# Patient Record
Sex: MALE | Race: WHITE
[De-identification: names, ages, dates, MRNs, and addresses within clinical notes are randomized per-mention and may not be internally consistent; named-entity substitution may affect disease eponyms.]

---

## 2019-02-12 NOTE — ED
Abdominal Pain HPI





<Glen Avila - Last Filed: 02/12/19 17:59>





- General


Source: patient


Mode of arrival: ambulatory


Limitations: no limitations





<Yvrose Mortensen - Last Filed: 02/12/19 20:43>





- General


Chief Complaint: Abdominal Pain


Stated Complaint: Lower abd pain-Med Ex sent


Time Seen by Provider: 02/12/19 13:43





- History of Present Illness


Initial Comments: 


This is a 28-year-old male presenting today for chief complaint of right-sided 

abdominal pain, chills and night sweats.  Patient states that he has had fever, 

chills night sweats for the past 1-2 days, patient did not record a temperature

, he states he's also been experiencing right-sided abdominal pain.  He states 

he presented for evaluation at an urgent care where he was sent to the 

emergency department to rule out appendicitis.  Patient denies any nausea 

vomiting diarrhea.  He denies any melena or hematochezia.  Patient denies any 

testicular swelling or severe testicular pain.  Patient denies any chest pain, 

dyspnea, dyspnea on exertion.  Patient denies any inability to tolerate by 

mouth intake.  Patient denies any upper abdominal pain, patient denies noticing 

a pattern with the pain.  He states it does seem to increase with ambulation.  

Remaining review of systems negative, patient denies any recent back pain, 

abdominal pain, numbness or tingling, dysuria or hematuria, headaches or visual 

changes, or any other complaints.


 (Yvrose Mortensen)





- Related Data


 Home Medications











 Medication  Instructions  Recorded  Confirmed


 


No Known Home Medications  02/12/19 02/12/19











 Allergies











Allergy/AdvReac Type Severity Reaction Status Date / Time


 


No Known Allergies Allergy   Verified 02/12/19 15:35














Review of Systems


ROS Other: All systems not noted in ROS Statement are negative.





<Glen Avila - Last Filed: 02/12/19 17:59>


ROS Other: All systems not noted in ROS Statement are negative.





<Yvrose Mortensen - Last Filed: 02/12/19 20:43>


ROS Statement: 


Those systems with pertinent positive or pertinent negative responses have been 

documented in the HPI.








Past Medical History


Past Medical History: No Reported History


History of Any Multi-Drug Resistant Organisms: None Reported


Past Surgical History: No Surgical Hx Reported


Past Psychological History: No Psychological Hx Reported


Smoking Status: Never smoker


Past Alcohol Use History: Occasional


Past Drug Use History: None Reported





<Yvrose Mortensen - Last Filed: 02/12/19 20:43>





General Exam





<Glen Avila - Last Filed: 02/12/19 17:59>


Limitations: no limitations





<Yvrose Mortensen - Last Filed: 02/12/19 20:43>





- General Exam Comments


Initial Comments: 


General:  The patient is awake and alert, in no distress, and does not appear 

acutely ill. 


Eye:  Pupils are equal, round and reactive to light, extra-ocular movements are 

intact.  No nystagmus.  There is normal conjunctiva bilaterally.  No signs of 

icterus.  


Ears, nose, mouth and throat:  There are moist mucous membranes and no oral 

lesions. 


Neck:  The neck is supple, there is no tenderness or JVD.  


Cardiovascular:  There is a regular rate and rhythm. No murmur, rub or gallop 

is appreciated.


Respiratory:  Lungs are clear to auscultation, respirations are non-labored, 

breath sounds are equal.  No wheezes, stridor, rales, or rhonchi.


Gastrointestinal: No noted diaphoresis, jaundice, pallor, protecting postures 

or squirming.


Symmetrical pigmentation of abdomen without signs of inflammation, scars, or 

striae. Umbilicus mildline, inverted without swelling. No dilated veins. 

Abdomen contour schaphiod, no noted abdominal distention. No visible masses. No 

peristalsis, aortic pulsations, or ventral hernia. Bowel sounds audible in all 

4 quadrants, unremarkable.  No friction rubs or venous hums. No epigastic, 

hepatic or abdominal bruits.  Patient is tender diffusely over the right side 

the abdomen mid to lower to deep and light palpation.. Liver edge, not 

palpable. Spleen edge, right and left kidney not palpable. Superior bladder 

margin non-tender. 


Special Testing:


Negative [place], [place], McBurney, [place], cutaneous hyperesthesia. 

Iliopsoas and obturator tests negative bilaterally. Negative Heel Jar test. No 

CVA tenderness. Digital rectal exam deferred. Negative grey turners or cullens 

sign


Musculoskeletal:  Normal ROM, no tenderness.  Strength 5/5. Sensation intact. 

Pulses equal bilaterally 2+.  


Neurological:  A&O x 3. CN II-XII intact, There are no obvious motor or sensory 

deficits. Coordination appears grossly intact. Speech is normal.


Skin:  Skin is warm and dry and no rashes or lesions are noted. 


Psychiatric:  Cooperative, appropriate mood & affect, normal judgment.  


 (Yvrose Mortensen)





Course





<Glen Avila - Last Filed: 02/12/19 17:59>





<Yvrose Mortensen - Last Filed: 02/12/19 20:43>


 Vital Signs











  02/12/19 02/12/19





  13:30 18:22


 


Temperature 98.5 F 


 


Pulse Rate 66 63


 


Respiratory 18 18





Rate  


 


Blood Pressure 129/83 138/87


 


O2 Sat by Pulse 100 100





Oximetry  














- Reevaluation(s)


Reevaluation #1: 





02/12/19 18:00


PA supervision: I proceeded face-to-face evaluation the patient is been having 

abdominal pain especially right lower quadrant type pain with some night sweats 

and generally not feeling well.  He was seen at outpatient clinic and sent here 

for evaluation for possible appendicitis.  He does demonstrate tenderness 

palpation of the mid abdomen.  CAT scan shows evidence of a transverse colonic 

diverticulitis with circumferential inflammation.  Due to the symptoms patient 

will require IV antibiotics and admission I did discuss case with Dr. Hollins.  

Dr. Carballo will be consulted.  I do agree with the assessment and plan (Glen Avila)





Medical Decision Making





- Lab Data


Result diagrams: 


 02/12/19 16:00





 02/12/19 16:00





<Glen Avila - Last Filed: 02/12/19 17:59>





- Lab Data


Result diagrams: 


 02/12/19 16:00





 02/12/19 16:00





<Yvrose Mortensen - Last Filed: 02/12/19 20:43>





- Medical Decision Making


28-year-old male presenting today for chief complaint of right-sided abdominal 

pain sent for rule out appendicits.  Patient is no leukocytosis, afebrile upon 

arrival.  Patient does have abdominal pain on examination concerning for 

possible acute abdomen.  Patient did not show any recurrent rigidity or 

guarding or signs for peritoneal irritation on examination.  Patient does 

however admit to pain with ambulation at times.  CT obtained revealed a section 

of the transverse colon concerning for diverticulitis with moderate infection, 

remaining CT unremarkable, no evidence of acute appendicits.  Given the extent 

of inflammation and inflammatory changes I feel patient would benefit from IV 

antibiotics and admission.  CT was reviewed by myself as well as admitting 

provider Dr. Avila, he did evaluate the patient face-to-face recommending 

inpatient treatment.  Patient is agreeable to admission denies questions at 

this time.  Patient will have IV fluids running, ordered liquid diet and have 

gastroenterology on consult.  Patient started on Zosyn, there is no further 

orders given by admitting provider who spoke case in detail with attending 

provider Dr. Avila.


 (BalbinaYvrose L)





- Lab Data


 Lab Results











  02/12/19 02/12/19 02/12/19 Range/Units





  16:00 16:00 16:00 


 


WBC   10.3   (3.8-10.6)  k/uL


 


RBC   5.50   (4.30-5.90)  m/uL


 


Hgb   16.5   (13.0-17.5)  gm/dL


 


Hct   47.6   (39.0-53.0)  %


 


MCV   86.6   (80.0-100.0)  fL


 


MCH   30.0   (25.0-35.0)  pg


 


MCHC   34.6   (31.0-37.0)  g/dL


 


RDW   12.7   (11.5-15.5)  %


 


Plt Count   222   (150-450)  k/uL


 


Neutrophils %   66   %


 


Lymphocytes %   26   %


 


Monocytes %   6   %


 


Eosinophils %   2   %


 


Basophils %   0   %


 


Neutrophils #   6.8   (1.3-7.7)  k/uL


 


Lymphocytes #   2.7   (1.0-4.8)  k/uL


 


Monocytes #   0.6   (0-1.0)  k/uL


 


Eosinophils #   0.2   (0-0.7)  k/uL


 


Basophils #   0.0   (0-0.2)  k/uL


 


Sodium  143    (137-145)  mmol/L


 


Potassium  4.5    (3.5-5.1)  mmol/L


 


Chloride  104    ()  mmol/L


 


Carbon Dioxide  27    (22-30)  mmol/L


 


Anion Gap  12    mmol/L


 


BUN  13    (9-20)  mg/dL


 


Creatinine  0.89    (0.66-1.25)  mg/dL


 


Est GFR (CKD-EPI)AfAm  >90    (>60 ml/min/1.73 sqM)  


 


Est GFR (CKD-EPI)NonAf  >90    (>60 ml/min/1.73 sqM)  


 


Glucose  98    (74-99)  mg/dL


 


Calcium  9.8    (8.4-10.2)  mg/dL


 


Total Bilirubin  0.9    (0.2-1.3)  mg/dL


 


AST  20    (17-59)  U/L


 


ALT  30    (21-72)  U/L


 


Alkaline Phosphatase  42    ()  U/L


 


Total Protein  7.9    (6.3-8.2)  g/dL


 


Albumin  4.8    (3.5-5.0)  g/dL


 


Amylase  60    ()  U/L


 


Lipase  32    ()  U/L


 


Urine Color    Yellow  


 


Urine Appearance    Clear  (Clear)  


 


Urine pH    5.5  (5.0-8.0)  


 


Ur Specific Gravity    1.028  (1.001-1.035)  


 


Urine Protein    Trace H  (Negative)  


 


Urine Glucose (UA)    Negative  (Negative)  


 


Urine Ketones    Negative  (Negative)  


 


Urine Blood    Negative  (Negative)  


 


Urine Nitrite    Negative  (Negative)  


 


Urine Bilirubin    Negative  (Negative)  


 


Urine Urobilinogen    <2.0  (<2.0)  mg/dL


 


Ur Leukocyte Esterase    Negative  (Negative)  














Disposition





<Glen Avila - Last Filed: 02/12/19 17:59>


Is patient prescribed a controlled substance at d/c from ED?: No


Time of Disposition: 17:58


Decision to Admit Reason: Admit from EC


Decision Date: 02/12/19


Decision Time: 17:58





<Yvrose Mortensen - Last Filed: 02/12/19 20:43>


Clinical Impression: 


 Diverticulitis





Disposition: ADMITTED AS IP TO THIS HOSP


Condition: Stable

## 2019-02-12 NOTE — CT
EXAMINATION TYPE: CT abdomen pelvis w con

 

DATE OF EXAM: 2/12/2019

 

COMPARISON: None

 

HISTORY: Generalized abdominal pain with fever and chills.

 

CT DLP: 650.8 mGycm

Automated exposure control for dose reduction was used.

 

TECHNIQUE:  Helical acquisition of images was performed from the lung bases through the pelvis.

 

CONTRAST: Performed without Oral Contrast and with IV Contrast, patient injected with 100 mL of Isovu
e 300.

 

FINDINGS: 

 

LUNG BASES: No significant abnormality is appreciated.

 

LIVER/GB: No significant abnormality is appreciated.

 

PANCREAS: No significant abnormality is seen.

 

SPLEEN: No significant abnormality is seen.

 

ADRENALS: No significant abnormality is seen.

 

KIDNEYS: No significant abnormality is seen.

 

PERITONEAL CAVITY:  No pneumoperitoneum. No fluid.

 

RETROPERITONEAL ADENOPATHY:  None visualized

 

REPRODUCTIVE ORGANS: No significant abnormality is seen

 

URINARY BLADDER:  No significant abnormality is seen.

 

PELVIC ADENOPATHY:  None visualized.

 

OSSEOUS STRUCTURES:  No significant abnormality is seen.

 

BOWEL:  There is a 5 cm segment of distal transverse colon demonstrating marked pericolonic inflammat
ion and moderate-marked circumferential wall thickening. It is difficult to visualize the margins of 
the colon, but there appears to be a 1 cm diverticulum at the center of this inflammatory process ema
nating anteriorly from the transverse colon (axial image 34 of 96). 

 

There are a few scattered descending and sigmoid colonic diverticula are noted.

 

OTHER: No acute vascular findings.

 

IMPRESSION: 

MODERATE-MARKED DIVERTICULITIS OF THE DISTAL TRANSVERSE COLON. 

 

EVENTUAL FOLLOW-UP DIRECT VISUALIZATION CAN EXCLUDE UNDERLYING PROCESS.

## 2019-02-13 NOTE — P.GSCN
History of Present Illness


Consult date: 02/13/19


Reason for Consult: 





abdominal pain








Requesting physician: Yvrose Mortensen


History of present illness: 





CHIEF COMPLAINT: Abdominal pain





HISTORY OF PRESENT ILLNESS: 28-year-old  male who presents to 

emergency room with a chief complaint of abdominal pain.  The patient reports 

the pain has been present for approximately 2 days.  He denies nausea or 

vomiting.  Denies diarrhea or constipation.  Denies melena, hematemesis, or 

hematochezia. Denies history of similar symptoms.





PAST MEDICAL HISTORY: 


See list.





PAST SURGICAL HISTORY: 


See list.





MEDICATIONS: 


See list.





ALLERGIES: 


See list.





SOCIAL HISTORY: No illicit drug use.  





REVIEW OF SYSTEMS: 


CONSTITUTIONAL: Denies fever or chills.


HEENT: Denies blurred vision, vision changes, or eye pain. Denies hemoptysis 


ENDOCRINE: Denies heat or cold intolerance.


CARDIOVASCULAR: Denies chest pain or pressure.


RESPIRATORY: No shortness of breath. 


GASTROINTESTINAL: Reports abdominal pain.  Denies nausea or vomiting.


NEURO: Denies history of seizures.


PSYCH: No depression or suicidal ideation


HEMATOLOGIC: Denies bleeding disorders.


LYMPHATIC:  The patient denies any lumps and bumps around the neck. 


GENITOURINARY:  Denies any blood in urine or increased urinary frequency.  


MUSCULOSKELETAL:  Denies myalgias. Denies joint swelling. Denies decreased 

range of motion beyond patients baseline.


SKIN: Denies pruitis. Denies rash.





PHYSICAL EXAM: 


VITAL SIGNS: Currently stable.


GENERAL: Well-developed in no acute distress. 


HEENT:  No sclera icterus. Extraocular movements grossly intact.  Moist buccal 

mucosa. 


Head is atraumatic, normocephalic. Hears conversational speech. No nasal 

drainage.


NECK:  Supple without lymphadenopathy.


CHEST:  Non-labored respirations and equal bilateral excursions. 


CARDIOVASCULAR:  Regular rate with regular rhythm.  Palpable 2+ radial pulses.


ABDOMEN:  Soft.  Nondistended.  Nontender.


MUSCULOSKELETAL:  No clubbing, cyanosis or edema.


NEUROLOGIC:  No focal or lateralizing signs.  Cranial nerves II through XII 

grossly intact.


PSYCH:  Appropriate affect.  Alert and oriented to person, place and time.


SKIN: Well perfused.  Good skin turgor. 








ASSESSMENT: 


1.  Abdominal pain


2.  Diverticulitis of distal transverse colon








PLAN: 


1. Clear liquid diet


2. Levaquin and Flagyl


3. Patient may follow up with Dr. Carballo in 1 week


4. He will require outpatient colonoscopy in 4 weeks





Nurse practitioner note has been reviewed by physician. Signing provider agrees 

with the documented findings, assessment, and plan of care. 








Past Medical History


Past Medical History: No Reported History


History of Any Multi-Drug Resistant Organisms: None Reported


Past Surgical History: No Surgical Hx Reported


Past Psychological History: No Psychological Hx Reported


Smoking Status: Never smoker


Past Alcohol Use History: Occasional


Past Drug Use History: None Reported





Medications and Allergies


 Home Medications











 Medication  Instructions  Recorded  Confirmed  Type


 


No Known Home Medications  02/12/19 02/12/19 History











 Allergies











Allergy/AdvReac Type Severity Reaction Status Date / Time


 


No Known Allergies Allergy   Verified 02/12/19 15:35














Surgical - Exam


 Vital Signs











Temp Pulse Resp BP Pulse Ox


 


 98.5 F   66   18   129/83   100 


 


 02/12/19 13:30  02/12/19 13:30  02/12/19 13:30  02/12/19 13:30  02/12/19 13:30














Results





- Labs





 02/12/19 16:00





 02/12/19 16:00


 Abnormal Lab Results - Last 24 Hours (Table)











  02/12/19 Range/Units





  16:00 


 


Urine Protein  Trace H  (Negative)  








 Diabetes panel











  02/12/19 Range/Units





  16:00 


 


Sodium  143  (137-145)  mmol/L


 


Potassium  4.5  (3.5-5.1)  mmol/L


 


Chloride  104  ()  mmol/L


 


Carbon Dioxide  27  (22-30)  mmol/L


 


BUN  13  (9-20)  mg/dL


 


Creatinine  0.89  (0.66-1.25)  mg/dL


 


Glucose  98  (74-99)  mg/dL


 


Calcium  9.8  (8.4-10.2)  mg/dL


 


AST  20  (17-59)  U/L


 


ALT  30  (21-72)  U/L


 


Alkaline Phosphatase  42  ()  U/L


 


Total Protein  7.9  (6.3-8.2)  g/dL


 


Albumin  4.8  (3.5-5.0)  g/dL








 Calcium panel











  02/12/19 Range/Units





  16:00 


 


Calcium  9.8  (8.4-10.2)  mg/dL


 


Albumin  4.8  (3.5-5.0)  g/dL








 Pituitary panel











  02/12/19 Range/Units





  16:00 


 


Sodium  143  (137-145)  mmol/L


 


Potassium  4.5  (3.5-5.1)  mmol/L


 


Chloride  104  ()  mmol/L


 


Carbon Dioxide  27  (22-30)  mmol/L


 


BUN  13  (9-20)  mg/dL


 


Creatinine  0.89  (0.66-1.25)  mg/dL


 


Glucose  98  (74-99)  mg/dL


 


Calcium  9.8  (8.4-10.2)  mg/dL








 Adrenal panel











  02/12/19 Range/Units





  16:00 


 


Sodium  143  (137-145)  mmol/L


 


Potassium  4.5  (3.5-5.1)  mmol/L


 


Chloride  104  ()  mmol/L


 


Carbon Dioxide  27  (22-30)  mmol/L


 


BUN  13  (9-20)  mg/dL


 


Creatinine  0.89  (0.66-1.25)  mg/dL


 


Glucose  98  (74-99)  mg/dL


 


Calcium  9.8  (8.4-10.2)  mg/dL


 


Total Bilirubin  0.9  (0.2-1.3)  mg/dL


 


AST  20  (17-59)  U/L


 


ALT  30  (21-72)  U/L


 


Alkaline Phosphatase  42  ()  U/L


 


Total Protein  7.9  (6.3-8.2)  g/dL


 


Albumin  4.8  (3.5-5.0)  g/dL

## 2019-02-13 NOTE — P.CONS
History of Present Illness





- Reason for Consult


Consult date: 02/13/19


Diverticulitis


Requesting physician: Joss Hollins





- Chief Complaint


Abdominal pain





- History of Present Illness





Pleasant 28-year-old male with no significant medical history who presented to 

the hospital with complaints of abdominal pain.  The patient reports 2 days of 

abdominal achy and cramping abdominal pain.  He states that the pain was worse 

in the middle and right side of his abdomen.  He denies any radiation of the 

pain.  He did feel that the pain would improve with lying down was not 

associated with bowel movements or eating.  The patient denies any changes in 

his bowel habits, constipation, diarrhea, hematochezia or melena.  He denies 

any familial history of ulcerative colitis or Crohn's disease.  He denies any 

sick contacts travel, new medications or antibiotics prior to the episode.  On 

presentation to the hospital the patient was found to have a normal amylase and 

lipase, with liver enzymes significant for a total bilirubin is 0.9, alkaline 

phosphatase 42, AST 20 and ALT 30.  The patient's hemoglobin was found to be 

16.5 and WBC count 10.  He had a CT scan of the abdomen which showed moderate 

diverticulitis of the distal transverse colon.





Review of Systems





REVIEW OF SYSTEMS:


CONSTITUTIONAL: Denies any fevers, chills, weight change or fatigue.


CARDIOVASCULAR: Denies any chest pain, palpitations high or low blood pressures


RESPIRATORY: Denies any shortness of breath, hemoptysis or cough.  


GENITOURINARY:  No dysuria or hematuria. 


MUSCULOSKELETAL: No weakness reported. 


SKIN: Denies any new rashes or lesions, jaundice or pallor. 


PSYCHIATRIC: Denies any depression or anxiety. 


NEUROLOGY: Denies headache, denies any new focal deficits. 


EARS/NOSE/THROAT: No recent hearing change, congestion, nasal discharge or sore 

throat.


EYES: No pain in eyes, discharge or change in vision. 


GASTROINTESTINAL: As per HPI.





Past Medical History


Past Medical History: No Reported History


History of Any Multi-Drug Resistant Organisms: None Reported


Past Surgical History: No Surgical Hx Reported


Past Psychological History: No Psychological Hx Reported


Smoking Status: Never smoker


Past Alcohol Use History: Occasional


Past Drug Use History: None Reported


Additional History: Family history:  Reviewed with the patient and 

noncontributory to current medical presentation





Medications and Allergies


 Home Medications











 Medication  Instructions  Recorded  Confirmed  Type


 


No Known Home Medications  02/12/19 02/12/19 History











 Allergies











Allergy/AdvReac Type Severity Reaction Status Date / Time


 


No Known Allergies Allergy   Verified 02/12/19 15:35














Physical Exam


Vitals: 


 Vital Signs











  Temp Pulse Resp BP Pulse Ox


 


 02/13/19 13:50  98.0 F  57 L  18  126/76  100


 


 02/13/19 07:12  97.9 F  51 L  16  117/72  98








 Intake and Output











 02/13/19 02/13/19 02/14/19





 14:59 22:59 06:59


 


Intake Total 540  


 


Balance 540  


 


Intake:   


 


  Oral 540  


 


Other:   


 


  # Voids 2 2 














On physical examination, patient appears comfortable in no apparent distress. 


HEAD: Normocephalic, atraumatic. 


EYES: No scleral icterus. No conjunctival injection. 


MOUTH: No lesions, tongue midline. 


NECK: Trachea midline, no gross abnormalities. 


CHEST: Clear to auscultation with no wheezing or rhonchi appreciated. 


HEART: Regular rate and rhythm. 


ABDOMEN: Soft, tender to palpation in the right abdomen worse than left 

abdomen. Bowel sounds are positive. No organomegaly.  No guarding or rigidity.


EXTREMITIES: No pedal edema. 


SKIN: No rashes, no jaundice. 


NEUROLOGIC: Alert and oriented x3.  No focal deficits. 





Results


CBC & Chem 7: 


 02/12/19 16:00





 02/12/19 16:00


CT scan - abdomen: report reviewed (CT scan of the abdomen which showed 

moderate diverticulitis of the distal transverse colon.)





Assessment and Plan


(1) Diverticulitis


Narrative/Plan: 


Patient presenting with complaints of abdominal pain found to have 

uncomplicated diverticulitis on CT evaluation.  No prior episodes reported by 

the patient.


Current Visit: Yes   Status: Acute   Code(s): K57.92 - DVTRCLI OF INTEST, PART 

UNSP, W/O PERF OR ABSCESS W/O BLEED   SNOMED Code(s): 142757464


   





(2) Abdominal pain


Current Visit: Yes   Status: Acute   Code(s): R10.9 - UNSPECIFIED ABDOMINAL 

PAIN   SNOMED Code(s): 90632062


   


Plan: 





Supportive care


Clear liquid diet, advance as tolerated


Continue IV antibiotics currently receiving levofloxacin and Flagyl, to be 

transitioned to oral medications on discharge for 10-14 days


Discussion with the patient's and would recommend low fiber diet for 2 weeks 

after discharge, with eventual transition to high fiber diet in the setting of 

diverticular disease


Patient will need colonoscopic evaluation 4-6 weeks after discharge


Thank you for allowing us to participate in the care of this patient we will 

continue to follow

## 2019-02-13 NOTE — P.HPIM
History of Present Illness


H&P Date: 02/13/19





visit is this a pleasant 28-year-old white male who was admitted to the 

hospital with acute abdominal pain diagnosed with acute diverticulitis since 

currently on IV hydration and IV antibiotics is doing quite well without any 

significant problems he denies constipation hemorrhoids denies any blood gross 

per rectum.  He has no specific problems at this current time.





Review of Systems





GENERAL: Patient denies fever. Denies chills.


EYES: Denies blurred vision. Denies vision changes. Denies eye pain.


EARS, NOSE, MOUTH, & THROAT: Denies headache. Denies sore throat. Denies ear 

pain.


RESPIRATORY: Denies cough. Denies shortness of breath. Denies sputum 

production. Denies hemoptysis. 


CARDIOVASCULAR: Denies chest pain or pressure. Denies palpitations. Denies 

arrhythmias.


GASTROINTESTINAL. vision and is to abdominal pain right lower quadrant and left 

lower quadrant.  He denies significantconstipation. Denies nausea. Denies 

vomiting. Denies heartburn. Denies blood in the stool. 


GENITOURINARY: Denies urinary frequency. Denies burning. Denies dysuria. Denies 

cloudy urine.  Denies blood in the urine.


MUSCULOSKELETAL:  Denies myalgias. Denies joint swelling. Denies decreased 

range of motion beyond patients baseline.


INTEGUMENTARY: Denies pruitis. Denies rash.


PSYCHIATRIC: Denies suicidal or homicial ideations.


ENDOCRINE: Denies weight change. Denies polydipsia. Denies polyuria.


HEMATOLOGIC: Denies bleeding disorders. 





Past Medical History


Past Medical History: No Reported History


History of Any Multi-Drug Resistant Organisms: None Reported


Past Surgical History: No Surgical Hx Reported


Past Psychological History: No Psychological Hx Reported


Smoking Status: Never smoker


Past Alcohol Use History: Occasional


Past Drug Use History: None Reported





Medications and Allergies


 Home Medications











 Medication  Instructions  Recorded  Confirmed  Type


 


No Known Home Medications  02/12/19 02/12/19 History











 Allergies











Allergy/AdvReac Type Severity Reaction Status Date / Time


 


No Known Allergies Allergy   Verified 02/12/19 15:35














Physical Exam


Osteopathic Statement: *.  No significant issues noted on an osteopathic 

structural exam other than those noted in the History and Physical/Consult.


Vitals: 


 Vital Signs











  Temp Pulse Pulse Resp BP BP Pulse Ox


 


 02/13/19 13:50  98.0 F   57 L  18   126/76  100


 


 02/13/19 07:12  97.9 F   51 L  16   117/72  98


 


 02/12/19 22:00  97.6 F   55 L  18   119/72  99


 


 02/12/19 19:46  98.5 F   71  18   157/84  100


 


 02/12/19 18:22   63   18  138/87   100








 Intake and Output











 02/13/19 02/13/19 02/13/19





 06:59 14:59 22:59


 


Intake Total 900 540 


 


Balance 900 540 


 


Intake:   


 


  Oral 900 540 


 


Other:   


 


  # Voids 1 2 











GENERAL: This is a -year-old   in no apparent distress at the time of 

examination. Pleasant and cooperative.


HEENT: Head is atraumatic, normocephalic. Pupils are equal, round, and reactive 

to light. Sclerae anicteric. Conjunctivae are clear. Mucus membranes of the 

mouth are moist. Neck is supple. 


RESPIRATORY: Clear to auscultation. No wheezes, rales, or rhonchi.  No use of 

accessory muscles.  Patient maintaining oxygen saturation greater than 92%. No 

chest wall tenderness is noted on palpation or with deep breathing.


CARDIOVASCULAR: Regular rate and rhythm. S1 and S2 noted.  No systolic or 

diastolic murmur auscultated.  No JVD noted. No S3 or S4 noted.


GASTROINTESTINAL: No distention noted.  Abdomen soft and round.  Normal active 

bowel sounds auscultated x 4 quadrants.  No pain or tenderness noted upon 

palpation.


INTEGUMENTARY: No cyanosis. No jaundice. No rashes noted. No cellulitis noted.


EXTREMITIES: 2+ peripheral pulses. No evidence of peripheral edema. No calf 

tenderness noted.


NEUROLOGIC:  Cranial nerves II-XII intact.


PSYCHIATRIC: Awake, alert, and oriented X 3. Appropriate affect. Intact 

judgement and insight.





Results


CBC & Chem 7: 


 02/12/19 16:00





 02/12/19 16:00


Labs: 


 Abnormal Lab Results - Last 24 Hours (Table)











  02/12/19 Range/Units





  16:00 


 


Urine Protein  Trace H  (Negative)  














Thrombosis Risk Factor Assmnt





- Choose All That Apply


Any of the Below Risk Factors Present?: Yes


Each Factor Represents 1 point: Obesity (BMI >25)


Thrombosis Risk Factor Assessment Total Risk Factor Score: 1


Thrombosis Risk Factor Assessment Level: Low Risk





Assessment and Plan


(1) Diverticulitis


Current Visit: Yes   Status: Acute   Code(s): K57.92 - DVTRCLI OF INTEST, PART 

UNSP, W/O PERF OR ABSCESS W/O BLEED   SNOMED Code(s): 115400813


   


Plan: 





plan admit patient IV antibiotics IV hydration pain control increase diet as 

tolerated hopefully discharge 24-36 hours.Dr. Humphrey's group [facility] 

hospitalists group is on call tomorrow.

## 2019-02-14 NOTE — P.PN
Subjective


Progress Note Date: 02/14/19





CHIEF COMPLAINT: Abdominal pain





HISTORY OF PRESENT ILLNESS: Pain examined at the bedside. Denies abdominal 

pain. Denies nausea or vomiting. Tolerating PO intake. Vital signs stable.








PHYSICAL EXAM: 


VITAL SIGNS: Currently stable.


GENERAL: Well-developed in no acute distress. 


HEENT:  No sclera icterus. Extraocular movements grossly intact.  Moist buccal 

mucosa. 


Head is atraumatic, normocephalic. Hears conversational speech. No nasal 

drainage.


NECK:  Supple without lymphadenopathy.


CHEST:  Non-labored respirations and equal bilateral excursions. 


CARDIOVASCULAR:  Regular rate with regular rhythm.  Palpable 2+ radial pulses.


ABDOMEN:  Soft.  Nondistended.  Nontender.


MUSCULOSKELETAL:  No clubbing, cyanosis or edema.


NEUROLOGIC:  No focal or lateralizing signs.  Cranial nerves II through XII 

grossly intact.


PSYCH:  Appropriate affect.  Alert and oriented to person, place and time.


SKIN: Well perfused.  Good skin turgor. 








ASSESSMENT: 


1.  Abdominal pain


2.  Diverticulitis of distal transverse colon








PLAN: 


1. Advance diet as tolerated


2. Levaquin and Flagyl


3. Patient may follow up with Dr. Carballo in 1 week


4. He will require outpatient colonoscopy in 4 weeks


5. Stable for discharge from a surgical standpoint





Nurse practitioner note has been reviewed by physician. Signing provider agrees 

with the documented findings, assessment, and plan of care. 








Objective





- Vital Signs


Vital signs: 


 Vital Signs











Temp  97.9 F   02/14/19 05:45


 


Pulse  56 L  02/14/19 05:45


 


Resp  18   02/14/19 08:00


 


BP  108/71   02/14/19 05:45


 


Pulse Ox  98   02/14/19 05:45








 Intake & Output











 02/13/19 02/14/19 02/14/19





 18:59 06:59 18:59


 


Intake Total 540 800 


 


Balance 540 800 


 


Intake:   


 


  Oral 540 800 


 


Other:   


 


  # Voids 2 0 














- Labs


CBC & Chem 7: 


 02/12/19 16:00





 02/12/19 16:00

## 2019-02-14 NOTE — DS
DISCHARGE SUMMARY



DATE OF SERVICE:

02/14/2019



FINAL DIAGNOSES:

1. Acute diverticulitis moderate of the distal transverse colon.

2. Abdominal pain.



DISCHARGE DISPOSITION:

The patient is being discharged in stable condition with guarded prognosis.



HISTORY OF PRESENT ILLNESS:

This 28-year-old gentleman with a past medical history of no significant medical issues

being followed by Dr. Hollins in the outpatient setting was admitted with features of

abdominal pain.  The patient had acute diverticulitis in the transverse colon.  Patient

treated with IV antibiotics and IV hydration.  Patient improved significantly.  Dr. Castellanos from gastroenterology saw the patient, outpatient surgery also saw the patient

and recommended outpatient evaluation including colonoscopy.



On exam, vital signs are stable.  Cardiovascular S1, S2. Abdomen soft. Nervous system:

No focal deficits.



DISCHARGE ADVICE AND MEDICATIONS:

1. Discharge diet is cardiac, soft.

2. Activity limited until follow up.

3. Follow up with Dr. Hollins in 1 week.

4. Follow up with Gastroenterology and  surgery as recommended.



DISCHARGE MEDICATIONS:

1. Levaquin 500 mg p.o. daily for 5 days.

2. Flagyl 500 mg p.o. t.i.d. for 10 days.

Please send a copy of dictation to my office,  and also Dr. Hollins and Dr. Castellanos.





MMODL / IJN: [id number] / Job#: [id number]

## 2019-03-13 NOTE — P.OP
Date of Procedure: 03/13/19


Preoperative Diagnosis: 


Diverticulitis


Postoperative Diagnosis: 


Right colon polyp





Solitary right colon diverticulum


Procedure(s) Performed: 


Colonoscopy


Anesthesia: MAC


Surgeon: Jarret Carballo


Pathology: other (Colon polyp)


Condition: stable


Disposition: PACU


Description of Procedure: 


The patient's placed on the endoscopy table in the lateral position.  He 

received IV sedation.  Digital rectal exam was performed which revealed no 

abnormalities.  The prostate was symmetric without nodules.  The flexible 

colonoscope was then placed patient anus and passed throughout the entire colon.

 The ileocecal valve was visualized.  The cecum appeared normal.  In the 

ascending colon there was a polyp seen this was removed with the cold forcep.  

There is also a solitary right colon diverticulum seen.  The scope was then 

withdrawn remainder the ascending colon, transverse colon, descending colon and 

sigmoid colon appeared normal.  There is no evidence of any sigmoid 

diverticulitis.  The scope was then brought back the rectum and this appeared 

normal.  Scope was withdrawn for patient.

## 2019-03-13 NOTE — P.GSHP
History of Present Illness


H&P Date: 03/13/19


Chief Complaint: History of diverticulitis





Male with previous history of diverticulitis.  Patient rents today for 

colonoscopy.





Past Medical History


Past Medical History: No Reported History


Additional Past Medical History / Comment(s): diverticulitis


History of Any Multi-Drug Resistant Organisms: None Reported


Past Surgical History: No Surgical Hx Reported


Past Anesthesia/Blood Transfusion Reactions: No Reported Reaction


Smoking Status: Never smoker





- Past Family History


  ** Mother


Family Medical History: No Reported History





Medications and Allergies


                                Home Medications











 Medication  Instructions  Recorded  Confirmed  Type


 


Psyllium Husk 100% [Metamucil 6 gm PO DAILY PRN 03/11/19 03/13/19 History





Packet]    








                                    Allergies











Allergy/AdvReac Type Severity Reaction Status Date / Time


 


No Known Allergies Allergy   Verified 03/11/19 09:59














Surgical - Exam


                                   Vital Signs











Temp Pulse Resp BP Pulse Ox


 


 97.4 F L  62   16   129/69   100 


 


 03/13/19 07:17  03/13/19 07:17  03/13/19 07:17  03/13/19 07:17  03/13/19 07:17














- General


well developed, well nourished, no distress





- Eyes


PERRL





- ENT


normal pinna





- Neck


no masses





- Respiratory


normal expansion





- Cardiovascular


Rhythm: regular





- Abdomen


Abdomen: soft, non tender





Assessment and Plan


Assessment: 





History of diverticulitis.  We'll perform colonoscopy.

## 2021-02-13 ENCOUNTER — HOSPITAL ENCOUNTER (EMERGENCY)
Dept: HOSPITAL 47 - EC | Age: 31
Discharge: HOME | End: 2021-02-13
Payer: COMMERCIAL

## 2021-02-13 VITALS — SYSTOLIC BLOOD PRESSURE: 127 MMHG | TEMPERATURE: 98.6 F | HEART RATE: 67 BPM | DIASTOLIC BLOOD PRESSURE: 85 MMHG

## 2021-02-13 VITALS — RESPIRATION RATE: 20 BRPM

## 2021-02-13 DIAGNOSIS — R07.9: Primary | ICD-10-CM

## 2021-02-13 LAB
ALBUMIN SERPL-MCNC: 5.5 G/DL (ref 3.5–5)
ALP SERPL-CCNC: 59 U/L (ref 38–126)
ALT SERPL-CCNC: 27 U/L (ref 4–49)
ANION GAP SERPL CALC-SCNC: 12 MMOL/L
APTT BLD: 27.3 SEC (ref 22–30)
AST SERPL-CCNC: 28 U/L (ref 17–59)
BASOPHILS # BLD AUTO: 0.1 K/UL (ref 0–0.2)
BASOPHILS NFR BLD AUTO: 1 %
BUN SERPL-SCNC: 14 MG/DL (ref 9–20)
CALCIUM SPEC-MCNC: 10.1 MG/DL (ref 8.4–10.2)
CHLORIDE SERPL-SCNC: 99 MMOL/L (ref 98–107)
CO2 SERPL-SCNC: 26 MMOL/L (ref 22–30)
D DIMER PPP FEU-MCNC: 0.2 MG/L FEU (ref ?–0.6)
EOSINOPHIL # BLD AUTO: 0.1 K/UL (ref 0–0.7)
EOSINOPHIL NFR BLD AUTO: 1 %
ERYTHROCYTE [DISTWIDTH] IN BLOOD BY AUTOMATED COUNT: 5.74 M/UL (ref 4.3–5.9)
ERYTHROCYTE [DISTWIDTH] IN BLOOD: 12.4 % (ref 11.5–15.5)
GLUCOSE SERPL-MCNC: 97 MG/DL (ref 74–99)
HCT VFR BLD AUTO: 49.7 % (ref 39–53)
HGB BLD-MCNC: 17.3 GM/DL (ref 13–17.5)
INR PPP: 1.1 (ref ?–1.2)
LIPASE SERPL-CCNC: 40 U/L (ref 23–300)
LYMPHOCYTES # SPEC AUTO: 3 K/UL (ref 1–4.8)
LYMPHOCYTES NFR SPEC AUTO: 38 %
MAGNESIUM SPEC-SCNC: 1.8 MG/DL (ref 1.6–2.3)
MCH RBC QN AUTO: 30.2 PG (ref 25–35)
MCHC RBC AUTO-ENTMCNC: 34.8 G/DL (ref 31–37)
MCV RBC AUTO: 86.6 FL (ref 80–100)
MONOCYTES # BLD AUTO: 0.4 K/UL (ref 0–1)
MONOCYTES NFR BLD AUTO: 6 %
NEUTROPHILS # BLD AUTO: 4.1 K/UL (ref 1.3–7.7)
NEUTROPHILS NFR BLD AUTO: 52 %
PLATELET # BLD AUTO: 265 K/UL (ref 150–450)
POTASSIUM SERPL-SCNC: 3.9 MMOL/L (ref 3.5–5.1)
PROT SERPL-MCNC: 8.9 G/DL (ref 6.3–8.2)
PT BLD: 11.4 SEC (ref 9–12)
SODIUM SERPL-SCNC: 137 MMOL/L (ref 137–145)
WBC # BLD AUTO: 7.9 K/UL (ref 3.8–10.6)

## 2021-02-13 PROCEDURE — 99285 EMERGENCY DEPT VISIT HI MDM: CPT

## 2021-02-13 PROCEDURE — 36415 COLL VENOUS BLD VENIPUNCTURE: CPT

## 2021-02-13 PROCEDURE — 85025 COMPLETE CBC W/AUTO DIFF WBC: CPT

## 2021-02-13 PROCEDURE — 85610 PROTHROMBIN TIME: CPT

## 2021-02-13 PROCEDURE — 83735 ASSAY OF MAGNESIUM: CPT

## 2021-02-13 PROCEDURE — 85730 THROMBOPLASTIN TIME PARTIAL: CPT

## 2021-02-13 PROCEDURE — 80053 COMPREHEN METABOLIC PANEL: CPT

## 2021-02-13 PROCEDURE — 83690 ASSAY OF LIPASE: CPT

## 2021-02-13 PROCEDURE — 71046 X-RAY EXAM CHEST 2 VIEWS: CPT

## 2021-02-13 PROCEDURE — 85379 FIBRIN DEGRADATION QUANT: CPT

## 2021-02-13 PROCEDURE — 93005 ELECTROCARDIOGRAM TRACING: CPT

## 2021-02-13 PROCEDURE — 84484 ASSAY OF TROPONIN QUANT: CPT

## 2021-02-13 NOTE — XR
EXAMINATION TYPE: XR chest 2V

 

DATE OF EXAM: 2/13/2021

 

COMPARISON: NONE

 

HISTORY: Chest pain.

 

TECHNIQUE:  Frontal and lateral views of the chest are obtained.

 

FINDINGS:  There is no focal air space opacity, pleural effusion, or pneumothorax seen.  The cardiac 
silhouette size is within normal limits.   The osseous structures are intact.

 

IMPRESSION:  No acute cardiopulmonary process.

## 2021-02-13 NOTE — ED
Chest Pain HPI





- General


Chief Complaint: Chest Pain


Stated Complaint: chest pain, SOB


Time Seen by Provider: 02/13/21 12:30


Source: patient


Mode of arrival: ambulatory


Limitations: no limitations





- History of Present Illness


Initial Comments: 





The patient is a 30-year-old male with no past medical history who presents to 

the emergency department with reported chest pain.  Patient reports having chest

pain for the past 2 days.  Starts in the left side of his chest and radiates up 

to his left shoulder and in between his shoulder blades.  States that the pain 

is pleuritic in nature.  Denies ripping or tearing sensation.  No numbness, 

tingling or weakness in his extremities.  No headaches or visual changes.  

Denies cough, fevers or chills.  No history of DVT or PE.  No lower extremity 

swelling.  Denies family history of sudden cardiac death.  No history of IV drug

use.  Denies any abdominal pain.  No fevers or chills.  Patient was tested for 

Covid 2 days ago as his wife is positive.  States that his test was negative.  

Denies loss of sense of smell or taste.  Has been warranting from his wife.  No 

other alleviating, precipitating or modifying factors





- Related Data


                                Home Medications











 Medication  Instructions  Recorded  Confirmed


 


Ascorbic Acid [Vitamin C] 500 mg PO DAILY 02/13/21 02/13/21


 


Lisdexamfetamine Dimesylate 20 mg PO QAM PRN 02/13/21 02/13/21





[Vyvanse]   











                                    Allergies











Allergy/AdvReac Type Severity Reaction Status Date / Time


 


No Known Allergies Allergy   Verified 02/13/21 13:57














Review of Systems


ROS Statement: 


Those systems with pertinent positive or pertinent negative responses have been 

documented in the HPI.





ROS Other: All systems not noted in ROS Statement are negative.





EKG Findings





- EKG Comments:


EKG Findings:: EKG demonstrates normal sinus rhythm with a ventricular rate of 

69. AL interval 172.  .  QTC of 430.  No acute ST segment elevations or 

depressions.  No signs of HOCM, WPW or ARVD.





Past Medical History


Past Medical History: No Reported History


Additional Past Medical History / Comment(s): diverticulitis


History of Any Multi-Drug Resistant Organisms: None Reported


Past Surgical History: No Surgical Hx Reported


Past Anesthesia/Blood Transfusion Reactions: No Reported Reaction


Past Psychological History: No Psychological Hx Reported


Smoking Status: Never smoker


Past Alcohol Use History: Occasional


Past Drug Use History: None Reported





- Past Family History


  ** Mother


Family Medical History: No Reported History





General Exam


Limitations: no limitations





Course


                                   Vital Signs











  02/13/21 02/13/21 02/13/21





  12:29 12:49 13:15


 


Temperature 98 F  


 


Pulse Rate 66  75


 


Pulse Rate [  61 





Cardiac Monitor   





]   


 


Respiratory 18 18 20





Rate   


 


Blood Pressure 151/97  


 


O2 Sat by Pulse 100  100





Oximetry   














  02/13/21





  14:31


 


Temperature 98.6 F


 


Pulse Rate 67


 


Pulse Rate [ 





Cardiac Monitor 





] 


 


Respiratory 20





Rate 


 


Blood Pressure 127/85


 


O2 Sat by Pulse 99





Oximetry 














Chest Pain MDM





- MDM





Upon arrival patient is placed into room 3.  A thorough history and physical 

exam was performed.  Laboratory studies are conducted in the patient for chest 

x-ray.  Review the patient's lab demonstrates a negative d-dimer.  Troponin is 

negative.  I did offer [place] the patient for Covid however he refused.  Chest 

x-ray demonstrates no acute cardiopulmonary process.  Bedside ultrasound was 

performed which does not demonstrate any signs of pericardial effusion.  EF 

visualized as grossly normal.  No thickened intraventricular septum.  Results 

are discussed the patient.  Did recommend he follow up with his primary care 

doctor for formal echo.  Patient should also have Holter monitoring.  He is to 

continue to remain quarentined.  Return to the emergency room for any new or 

worsening symptoms.  Alternate taking Motrin and Tylenol for pain control.  

Patient agreed to this and was discharged in stable condition





Disposition


Clinical Impression: 


 Chest pain





Disposition: HOME SELF-CARE


Condition: Stable


Instructions (If sedation given, give patient instructions):  Chest Pain (ED)


Additional Instructions: 


Please follow up with your primary care doctor in 2-4 days.  Return to the 

emergency room for any new or worsening symptoms. I recommend an echo of your 

heart


Is patient prescribed a controlled substance at d/c from ED?: No


Referrals: 


Joss Hollins DO [Primary Care Provider] - 1-2 days


[facility] [Provider Group] - 1-2 days


Time of Disposition: 14:31

## 2021-08-15 ENCOUNTER — HOSPITAL ENCOUNTER (EMERGENCY)
Dept: HOSPITAL 47 - EC | Age: 31
Discharge: HOME | End: 2021-08-15
Payer: COMMERCIAL

## 2021-08-15 VITALS — HEART RATE: 62 BPM | SYSTOLIC BLOOD PRESSURE: 122 MMHG | DIASTOLIC BLOOD PRESSURE: 82 MMHG

## 2021-08-15 VITALS — RESPIRATION RATE: 18 BRPM

## 2021-08-15 VITALS — TEMPERATURE: 98.2 F

## 2021-08-15 DIAGNOSIS — M25.512: ICD-10-CM

## 2021-08-15 DIAGNOSIS — R07.89: Primary | ICD-10-CM

## 2021-08-15 LAB
ALBUMIN SERPL-MCNC: 5.5 G/DL (ref 3.5–5)
ALP SERPL-CCNC: 76 U/L (ref 38–126)
ALT SERPL-CCNC: 21 U/L (ref 4–49)
ANION GAP SERPL CALC-SCNC: 16 MMOL/L
APTT BLD: 26 SEC (ref 22–30)
AST SERPL-CCNC: 29 U/L (ref 17–59)
BASOPHILS # BLD AUTO: 0.1 K/UL (ref 0–0.2)
BASOPHILS NFR BLD AUTO: 1 %
BUN SERPL-SCNC: 15 MG/DL (ref 9–20)
CALCIUM SPEC-MCNC: 10.4 MG/DL (ref 8.4–10.2)
CHLORIDE SERPL-SCNC: 101 MMOL/L (ref 98–107)
CO2 SERPL-SCNC: 23 MMOL/L (ref 22–30)
EOSINOPHIL # BLD AUTO: 0.2 K/UL (ref 0–0.7)
EOSINOPHIL NFR BLD AUTO: 2 %
ERYTHROCYTE [DISTWIDTH] IN BLOOD BY AUTOMATED COUNT: 5.67 M/UL (ref 4.3–5.9)
ERYTHROCYTE [DISTWIDTH] IN BLOOD: 12.9 % (ref 11.5–15.5)
GLUCOSE SERPL-MCNC: 94 MG/DL (ref 74–99)
HCT VFR BLD AUTO: 49.6 % (ref 39–53)
HGB BLD-MCNC: 17.4 GM/DL (ref 13–17.5)
INR PPP: 1 (ref ?–1.2)
LYMPHOCYTES # SPEC AUTO: 3.2 K/UL (ref 1–4.8)
LYMPHOCYTES NFR SPEC AUTO: 31 %
MAGNESIUM SPEC-SCNC: 1.7 MG/DL (ref 1.6–2.3)
MCH RBC QN AUTO: 30.7 PG (ref 25–35)
MCHC RBC AUTO-ENTMCNC: 35.1 G/DL (ref 31–37)
MCV RBC AUTO: 87.4 FL (ref 80–100)
MONOCYTES # BLD AUTO: 0.5 K/UL (ref 0–1)
MONOCYTES NFR BLD AUTO: 5 %
NEUTROPHILS # BLD AUTO: 6.2 K/UL (ref 1.3–7.7)
NEUTROPHILS NFR BLD AUTO: 60 %
PLATELET # BLD AUTO: 341 K/UL (ref 150–450)
POTASSIUM SERPL-SCNC: 3.9 MMOL/L (ref 3.5–5.1)
PROT SERPL-MCNC: 8.6 G/DL (ref 6.3–8.2)
PT BLD: 10.7 SEC (ref 9–12)
SODIUM SERPL-SCNC: 140 MMOL/L (ref 137–145)
WBC # BLD AUTO: 10.3 K/UL (ref 3.8–10.6)

## 2021-08-15 PROCEDURE — 84484 ASSAY OF TROPONIN QUANT: CPT

## 2021-08-15 PROCEDURE — 83735 ASSAY OF MAGNESIUM: CPT

## 2021-08-15 PROCEDURE — 85730 THROMBOPLASTIN TIME PARTIAL: CPT

## 2021-08-15 PROCEDURE — 36415 COLL VENOUS BLD VENIPUNCTURE: CPT

## 2021-08-15 PROCEDURE — 93005 ELECTROCARDIOGRAM TRACING: CPT

## 2021-08-15 PROCEDURE — 99285 EMERGENCY DEPT VISIT HI MDM: CPT

## 2021-08-15 PROCEDURE — 85610 PROTHROMBIN TIME: CPT

## 2021-08-15 PROCEDURE — 71046 X-RAY EXAM CHEST 2 VIEWS: CPT

## 2021-08-15 PROCEDURE — 85025 COMPLETE CBC W/AUTO DIFF WBC: CPT

## 2021-08-15 PROCEDURE — 80053 COMPREHEN METABOLIC PANEL: CPT

## 2021-08-15 PROCEDURE — 96374 THER/PROPH/DIAG INJ IV PUSH: CPT

## 2021-08-15 NOTE — ED
General Adult HPI





- General


Chief complaint: Chest Pain


Stated complaint: chest pain


Time Seen by Provider: 08/15/21 17:55


Source: patient, RN notes reviewed, old records reviewed


Mode of arrival: wheelchair


Limitations: no limitations





- History of Present Illness


Initial comments: 





Patient is a 30-year-old male with past medical history that is unremarkable who

presents emergency Department complaining of 2 day history of left-sided chest 

pain and pressure.  He describes the pain as an achy, sharp sensation located in

the upper left area of his chest near his shoulder.  He states he has 

experienced the pain previously and multiple months ago received a cardiac 

workup which was unremarkable.  He states it has recurred for the last 2 days.  

He denies any known provocative factors.  He states he was lifting some heavy 

boxes and furniture approximately one week prior.  He was camping over the 

weekend is possible he may have strained a muscle, however is concerned endorses

pain has been persistent and has not improved.  He denies any family medical 

history of early onset heart disease.  Denies any difficulty breathing, 

abdominal pain, nausea, vomiting, lightheadedness, weakness, numbness.  He 

otherwise has no acute complaints at this time.  States the pain does not 

radiate.  It is worse slightly with movement of his left shoulder, particularly 

with extension of the left shoulder as well as abduction.  His attempt to take 

ibuprofen at home for pain management with some mild relief.  He is concerned 

that maybe his heart causing his current symptoms.





- Related Data


                                Home Medications











 Medication  Instructions  Recorded  Confirmed


 


Lisdexamfetamine Dimesylate 20 mg PO DAILY PRN 02/13/21 08/15/21





[Vyvanse]   








                                  Previous Rx's











 Medication  Instructions  Recorded


 


Ibuprofen [Motrin] 800 mg PO Q8H PRN 7 Days #21 tab 08/15/21


 


Lidocaine 5% Patch [Lidoderm 5% 1 patch TOPICAL DAILY PRN #7 patch 08/15/21





Patch]  


 


Methocarbamol [Robaxin-750] 1,500 mg PO Q8HR PRN 3 Days #18 08/15/21





 tablet 











                                    Allergies











Allergy/AdvReac Type Severity Reaction Status Date / Time


 


No Known Allergies Allergy   Verified 08/15/21 17:52














Review of Systems


ROS Statement: 


Those systems with pertinent positive or pertinent negative responses have been 

documented in the HPI.


Review of Systems:


CONST: Denies fever 


EYES: Denies blurry vision 


ENT: Denies nasal congestion  


C/V: Endorses chest pain


RESP: Denies shortness of breath 


GI: Denies abdominal pain 


: Denies dysuria  


SKIN: Denies rash.


MSK: Denies joint pain.


NEURO: Denies headache 


ROS Other: All systems not noted in ROS Statement are negative.





Past Medical History


Past Medical History: No Reported History


Additional Past Medical History / Comment(s): diverticulitis


History of Any Multi-Drug Resistant Organisms: None Reported


Past Surgical History: No Surgical Hx Reported


Past Anesthesia/Blood Transfusion Reactions: No Reported Reaction


Past Psychological History: No Psychological Hx Reported


Smoking Status: Never smoker


Past Alcohol Use History: Occasional


Past Drug Use History: None Reported





- Past Family History


  ** Mother


Family Medical History: No Reported History





General Exam





- General Exam Comments


Initial Comments: 





General: Appears in no acute distress.


HEAD:  Normal with no signs of head trauma.


EYES:  PERRLA, EOMI, conjunctiva normal, no discharge.


ENT:  Hearing grossly intact, normal oropharynx.


RESPIRATORY:  Clear breath sounds bilaterally.  No wheezes, rales, or rhonchi.  


C/V:  Regular rate and rhythm. S1 and S2 auscultated, no edema, peripheral 

pulses 2+ and intact throughout.  Patient does have reproducible pain on 

palpation of the left upper chest along the superior first rib  near the 

shoulder.  No obvious deformity palpated.


ABD:  Abd is soft, nontender, nondistended


EXT: Left first rib pain.  Normal range of motion of the left shoulder.  No 

obvious deformity appreciated.


SKIN:  No rashes or lesions observed on exposed skin.


NEURO: Alert and oriented 4.  No focal sensory strength deficits.


Limitations: no limitations





Course


                                   Vital Signs











  08/15/21 08/15/21 08/15/21





  17:47 18:52 19:57


 


Temperature 98.2 F  


 


Pulse Rate 78 69 62


 


Respiratory 20 18 18





Rate   


 


Blood Pressure 135/84 143/91 122/82


 


O2 Sat by Pulse 100 99 95





Oximetry   














Medical Decision Making





- Medical Decision Making





Based on the patient's presentation and physical exam, I low suspicion for 

cardiac etiology of the patient's pain at this time is likely musculoskeletal in

nature, however he is concerned his heart and therefore we will also obtain a 

cardiac workup including troponin, EKG, chest x-ray.  He'll be connected to 

continuous cardiac monitoring while is here in the department.  He will be 

provided with analgesia with aspirin, Toradol.  I did offer the patient Robaxin 

which she refused.  He'll be connected to continuous cardiac monitoring.  He was

in agreement this plan.





Patient's EKG shows normal sinus rhythm with no acute ischemic changes.  

Patient's chest x-ray reveals no acute cardio pulmonate process.  Patient's 

lavatory studies are unremarkable including a negative troponin.  Patient does 

have a slightly elevated calcium of 10.4.  Remainder of his labs are unr

emarkable.





On reevaluation, patient states that his pain is improved.  I do believe it is 

safe for him to be discharged home at this time.  He was in agreement with the 

plan.  Advised that he follow up with his PCP in the next few days.  Strict 

return precautions were placed.





I will provide the patient with a prescription for lidocaine patch, ibuprofen, 

Robaxin. I instructed the patient to follow up with their PCP in the next 3 

days.  I explained that the patient should return to the emergency department if

they experience any worsening symptoms. Strict return precautions were discussed

with the patient. The patient expressed understanding of these instructions. I 

answered all questions that the patient had. The patient was discharged home in 

good condition with their prescriptions and follow up information.





- Lab Data


Result diagrams: 


                                 08/15/21 18:33





                                 08/15/21 18:33


                                   Lab Results











  08/15/21 08/15/21 08/15/21 Range/Units





  18:33 18:33 18:33 


 


WBC  10.3    (3.8-10.6)  k/uL


 


RBC  5.67    (4.30-5.90)  m/uL


 


Hgb  17.4    (13.0-17.5)  gm/dL


 


Hct  49.6    (39.0-53.0)  %


 


MCV  87.4    (80.0-100.0)  fL


 


MCH  30.7    (25.0-35.0)  pg


 


MCHC  35.1    (31.0-37.0)  g/dL


 


RDW  12.9    (11.5-15.5)  %


 


Plt Count  341    (150-450)  k/uL


 


MPV  6.6    


 


Neutrophils %  60    %


 


Lymphocytes %  31    %


 


Monocytes %  5    %


 


Eosinophils %  2    %


 


Basophils %  1    %


 


Neutrophils #  6.2    (1.3-7.7)  k/uL


 


Lymphocytes #  3.2    (1.0-4.8)  k/uL


 


Monocytes #  0.5    (0-1.0)  k/uL


 


Eosinophils #  0.2    (0-0.7)  k/uL


 


Basophils #  0.1    (0-0.2)  k/uL


 


PT   10.7   (9.0-12.0)  sec


 


INR   1.0   (<1.2)  


 


APTT   26.0   (22.0-30.0)  sec


 


Sodium    140  (137-145)  mmol/L


 


Potassium    3.9  (3.5-5.1)  mmol/L


 


Chloride    101  ()  mmol/L


 


Carbon Dioxide    23  (22-30)  mmol/L


 


Anion Gap    16  mmol/L


 


BUN    15  (9-20)  mg/dL


 


Creatinine    0.87  (0.66-1.25)  mg/dL


 


Est GFR (CKD-EPI)AfAm    >90  (>60 ml/min/1.73 sqM)  


 


Est GFR (CKD-EPI)NonAf    >90  (>60 ml/min/1.73 sqM)  


 


Glucose    94  (74-99)  mg/dL


 


Calcium    10.4 H  (8.4-10.2)  mg/dL


 


Magnesium    1.7  (1.6-2.3)  mg/dL


 


Total Bilirubin    0.5  (0.2-1.3)  mg/dL


 


AST    29  (17-59)  U/L


 


ALT    21  (4-49)  U/L


 


Alkaline Phosphatase    76  ()  U/L


 


Troponin I     (0.000-0.034)  ng/mL


 


Total Protein    8.6 H  (6.3-8.2)  g/dL


 


Albumin    5.5 H  (3.5-5.0)  g/dL














  08/15/21 Range/Units





  18:33 


 


WBC   (3.8-10.6)  k/uL


 


RBC   (4.30-5.90)  m/uL


 


Hgb   (13.0-17.5)  gm/dL


 


Hct   (39.0-53.0)  %


 


MCV   (80.0-100.0)  fL


 


MCH   (25.0-35.0)  pg


 


MCHC   (31.0-37.0)  g/dL


 


RDW   (11.5-15.5)  %


 


Plt Count   (150-450)  k/uL


 


MPV   


 


Neutrophils %   %


 


Lymphocytes %   %


 


Monocytes %   %


 


Eosinophils %   %


 


Basophils %   %


 


Neutrophils #   (1.3-7.7)  k/uL


 


Lymphocytes #   (1.0-4.8)  k/uL


 


Monocytes #   (0-1.0)  k/uL


 


Eosinophils #   (0-0.7)  k/uL


 


Basophils #   (0-0.2)  k/uL


 


PT   (9.0-12.0)  sec


 


INR   (<1.2)  


 


APTT   (22.0-30.0)  sec


 


Sodium   (137-145)  mmol/L


 


Potassium   (3.5-5.1)  mmol/L


 


Chloride   ()  mmol/L


 


Carbon Dioxide   (22-30)  mmol/L


 


Anion Gap   mmol/L


 


BUN   (9-20)  mg/dL


 


Creatinine   (0.66-1.25)  mg/dL


 


Est GFR (CKD-EPI)AfAm   (>60 ml/min/1.73 sqM)  


 


Est GFR (CKD-EPI)NonAf   (>60 ml/min/1.73 sqM)  


 


Glucose   (74-99)  mg/dL


 


Calcium   (8.4-10.2)  mg/dL


 


Magnesium   (1.6-2.3)  mg/dL


 


Total Bilirubin   (0.2-1.3)  mg/dL


 


AST   (17-59)  U/L


 


ALT   (4-49)  U/L


 


Alkaline Phosphatase   ()  U/L


 


Troponin I  <0.012  (0.000-0.034)  ng/mL


 


Total Protein   (6.3-8.2)  g/dL


 


Albumin   (3.5-5.0)  g/dL














- EKG Data


-: EKG Interpreted by Me


EKG Comments: 





12-lead Electrocardiogram Interpretation Note





EKG was reviewed and interpreted by myself. 12-lead ECG performed at 1759 is 

interpreted by me as revealing normal sinus rhythm at a rate of 66 beats per 

minute.  Axis is normal..  Able saw 134 ms, QRS duration is 100 ms, QTc is 419 

ms..  There were no ST or T wave abnormalities to suggest myocardial ischemia or

injury. R wave progression across the precordium was satisfactory. By my 

interpretation this EKG is non-diagnostic for acute ischemia.





Disposition


Clinical Impression: 


 Chest pain of unknown etiology, Shoulder pain, Musculoskeletal pain





Disposition: HOME SELF-CARE


Condition: Good


Instructions (If sedation given, give patient instructions):  Chest Pain (ED)


Prescriptions: 


Lidocaine 5% Patch [Lidoderm 5% Patch] 1 patch TOPICAL DAILY PRN #7 patch


 PRN Reason: Pain


Ibuprofen [Motrin] 800 mg PO Q8H PRN 7 Days #21 tab


 PRN Reason: Pain


Methocarbamol [Robaxin-750] 1,500 mg PO Q8HR PRN 3 Days #18 tablet


 PRN Reason: Pain


Is patient prescribed a controlled substance at d/c from ED?: No


Referrals: 


Joss Hollins DO [Primary Care Provider] - 1-2 days

## 2021-08-15 NOTE — XR
EXAMINATION TYPE: XR chest 2V

 

DATE OF EXAM: 8/15/2021

 

COMPARISON: 2/13/2021

 

HISTORY: Chest pain

 

TECHNIQUE: 2 views

 

FINDINGS: Heart and mediastinum are normal. Lungs are clear. Diaphragm is normal. Bony thorax is inta
ct. There are chest leads.

 

IMPRESSION: Normal chest. No change.

## 2022-04-04 ENCOUNTER — HOSPITAL ENCOUNTER (EMERGENCY)
Dept: HOSPITAL 47 - EC | Age: 32
Discharge: HOME | End: 2022-04-04
Payer: COMMERCIAL

## 2022-04-04 VITALS — DIASTOLIC BLOOD PRESSURE: 74 MMHG | HEART RATE: 81 BPM | RESPIRATION RATE: 16 BRPM | SYSTOLIC BLOOD PRESSURE: 110 MMHG

## 2022-04-04 VITALS — TEMPERATURE: 97.5 F

## 2022-04-04 DIAGNOSIS — M94.0: Primary | ICD-10-CM

## 2022-04-04 LAB
ALBUMIN SERPL-MCNC: 4.6 G/DL (ref 3.5–5)
ALP SERPL-CCNC: 58 U/L (ref 38–126)
ALT SERPL-CCNC: 24 U/L (ref 4–49)
ANION GAP SERPL CALC-SCNC: 8 MMOL/L
APTT BLD: 26.3 SEC (ref 22–30)
AST SERPL-CCNC: 20 U/L (ref 17–59)
BASOPHILS # BLD AUTO: 0 K/UL (ref 0–0.2)
BASOPHILS NFR BLD AUTO: 0 %
BUN SERPL-SCNC: 13 MG/DL (ref 9–20)
CALCIUM SPEC-MCNC: 9.6 MG/DL (ref 8.4–10.2)
CHLORIDE SERPL-SCNC: 105 MMOL/L (ref 98–107)
CO2 SERPL-SCNC: 29 MMOL/L (ref 22–30)
EOSINOPHIL # BLD AUTO: 0.1 K/UL (ref 0–0.7)
EOSINOPHIL NFR BLD AUTO: 1 %
ERYTHROCYTE [DISTWIDTH] IN BLOOD BY AUTOMATED COUNT: 5.42 M/UL (ref 4.3–5.9)
ERYTHROCYTE [DISTWIDTH] IN BLOOD: 12.8 % (ref 11.5–15.5)
GLUCOSE SERPL-MCNC: 101 MG/DL (ref 74–99)
HCT VFR BLD AUTO: 47.2 % (ref 39–53)
HGB BLD-MCNC: 16.4 GM/DL (ref 13–17.5)
INR PPP: 1 (ref ?–1.2)
LIPASE SERPL-CCNC: 37 U/L (ref 23–300)
LYMPHOCYTES # SPEC AUTO: 2 K/UL (ref 1–4.8)
LYMPHOCYTES NFR SPEC AUTO: 23 %
MAGNESIUM SPEC-SCNC: 1.8 MG/DL (ref 1.6–2.3)
MCH RBC QN AUTO: 30.2 PG (ref 25–35)
MCHC RBC AUTO-ENTMCNC: 34.6 G/DL (ref 31–37)
MCV RBC AUTO: 87.1 FL (ref 80–100)
MONOCYTES # BLD AUTO: 0.3 K/UL (ref 0–1)
MONOCYTES NFR BLD AUTO: 4 %
NEUTROPHILS # BLD AUTO: 6.1 K/UL (ref 1.3–7.7)
NEUTROPHILS NFR BLD AUTO: 70 %
PLATELET # BLD AUTO: 242 K/UL (ref 150–450)
POTASSIUM SERPL-SCNC: 3.9 MMOL/L (ref 3.5–5.1)
PROT SERPL-MCNC: 7.8 G/DL (ref 6.3–8.2)
PT BLD: 10.7 SEC (ref 9–12)
SODIUM SERPL-SCNC: 142 MMOL/L (ref 137–145)
WBC # BLD AUTO: 8.7 K/UL (ref 3.8–10.6)

## 2022-04-04 PROCEDURE — 85379 FIBRIN DEGRADATION QUANT: CPT

## 2022-04-04 PROCEDURE — 99285 EMERGENCY DEPT VISIT HI MDM: CPT

## 2022-04-04 PROCEDURE — 36415 COLL VENOUS BLD VENIPUNCTURE: CPT

## 2022-04-04 PROCEDURE — 80053 COMPREHEN METABOLIC PANEL: CPT

## 2022-04-04 PROCEDURE — 84484 ASSAY OF TROPONIN QUANT: CPT

## 2022-04-04 PROCEDURE — 83880 ASSAY OF NATRIURETIC PEPTIDE: CPT

## 2022-04-04 PROCEDURE — 85610 PROTHROMBIN TIME: CPT

## 2022-04-04 PROCEDURE — 71046 X-RAY EXAM CHEST 2 VIEWS: CPT

## 2022-04-04 PROCEDURE — 93005 ELECTROCARDIOGRAM TRACING: CPT

## 2022-04-04 PROCEDURE — 96374 THER/PROPH/DIAG INJ IV PUSH: CPT

## 2022-04-04 PROCEDURE — 83735 ASSAY OF MAGNESIUM: CPT

## 2022-04-04 PROCEDURE — 85025 COMPLETE CBC W/AUTO DIFF WBC: CPT

## 2022-04-04 PROCEDURE — 83690 ASSAY OF LIPASE: CPT

## 2022-04-04 PROCEDURE — 85730 THROMBOPLASTIN TIME PARTIAL: CPT

## 2022-04-04 NOTE — XR
EXAMINATION TYPE: XR chest 2V

 

DATE OF EXAM: 4/4/2022

 

COMPARISON: Chest x-ray 8/15/2021

 

HISTORY: Chest pain

 

TECHNIQUE:  Frontal and lateral views of the chest are obtained.

 

FINDINGS:  There is no focal air space opacity, pleural effusion, or pneumothorax seen.  The cardiac 
silhouette size is within normal limits. There are overlying leads.  The osseous structures are intac
t.

 

IMPRESSION:  No acute cardiopulmonary process.

## 2022-04-04 NOTE — ED
Chest Pain HPI





- General


Chief Complaint: Chest Pain


Stated Complaint: chest pain


Time Seen by Provider: 04/04/22 12:52


Source: patient, RN notes reviewed


Mode of arrival: wheelchair


Limitations: no limitations





- History of Present Illness


Initial Comments: 





31-year-old male with a benign past medical history other than history of prior 

chest discomfort who presents today with complaints of left upper chest and left

upper shoulder pain.  He states the pain in the shoulder is sharp he points to 

the mid upper trapezius muscle area and above the scapula also states she has 

some pain to left upper chest wall and somewhat dull in nature is mild he can 

reproduce over by palpation.  He also had a recent cold about 2 weeks ago.  He 

is getting over this.  He also does a lot of heavy lifting.  He does not recall 

any particular incident however.  No fevers chills nausea vomiting sweats 

minimal cough no phlegm production reported he is a nonsmoker.


MD Complaint: chest pain





- Related Data


                                Home Medications











 Medication  Instructions  Recorded  Confirmed


 


No Known Home Medications  04/04/22 04/04/22











                                    Allergies











Allergy/AdvReac Type Severity Reaction Status Date / Time


 


No Known Allergies Allergy   Verified 04/04/22 14:12














Review of Systems


ROS Statement: 


Those systems with pertinent positive or pertinent negative responses have been 

documented in the HPI.





ROS Other: All systems not noted in ROS Statement are negative.





EKG Findings





- EKG Results:


EKG: interpreted by RAY, sinus rhythm (Sinus rhythm a 73.  We'll 151 QRS durati

on 100 QT since /406 nonspecific T-wave configuration this is compared to

one dated 8/15/21 showing similar configurations.)





Past Medical History


Past Medical History: No Reported History


Additional Past Medical History / Comment(s): diverticulitis


History of Any Multi-Drug Resistant Organisms: None Reported


Past Surgical History: No Surgical Hx Reported


Past Anesthesia/Blood Transfusion Reactions: No Reported Reaction


Past Psychological History: No Psychological Hx Reported


Smoking Status: Never smoker


Past Alcohol Use History: Occasional


Past Drug Use History: None Reported





- Past Family History


  ** Mother


Family Medical History: No Reported History





General Exam





- General Exam Comments


Initial Comments: 





This is a well-developed well-nourished awake alert oriented 3 male


Limitations: no limitations


General appearance: alert, in no apparent distress


Head exam: Present: atraumatic, normocephalic, normal inspection


Eye exam: Present: normal appearance, PERRL, EOMI.  Absent: scleral icterus, 

conjunctival injection, periorbital swelling


ENT exam: Present: normal exam, mucous membranes moist


Neck exam: Present: normal inspection, full ROM, other (No stridor JVD or 

bruits).  Absent: tenderness, meningismus, lymphadenopathy


Respiratory exam: Present: normal lung sounds bilaterally, chest wall tenderness

(Some mild times palpation of the left upper anterior chest wall over the 

lateral aspect of the costal chondral junction no step-off or crepitation).  

Absent: respiratory distress, wheezes, rales, rhonchi, stridor


Cardiovascular Exam: Present: regular rate, normal rhythm, normal heart sounds. 

Absent: systolic murmur, diastolic murmur, rubs, gallop, clicks


GI/Abdominal exam: Present: soft, normal bowel sounds.  Absent: distended, 

tenderness, guarding, rebound, rigid


Extremities exam: Present: normal inspection, full ROM, normal capillary refill.

 Absent: tenderness, pedal edema, joint swelling, calf tenderness


Back exam: Present: normal inspection, tenderness (Some mild tenderness 

palpation of left upper mid trapezius)


Neurological exam: Present: alert, oriented X3, CN II-XII intact


Psychiatric exam: Present: normal affect, normal mood


Skin exam: Present: warm, dry, intact, normal color.  Absent: rash





Course


                                   Vital Signs











  04/04/22 04/04/22





  12:34 12:49


 


Temperature 97.5 F L 


 


Pulse Rate 86 


 


Pulse Rate [  80





Cardiac Monitor  





]  


 


Respiratory 18 





Rate  


 


Blood Pressure 154/81 


 


O2 Sat by Pulse 100 





Oximetry  














Chest Pain MDM





- MDM





Image reviewed no acute findings I did discuss patient's findings with him and 

his wife.  Presentation consistent with musculoskeletal pain noncardiac pain.  

He is referred back to his doctor or to cardiology I did recommend an outpatient

stress test.  I did recommend nostril anti-inflammatories.





Disposition


Clinical Impression: 


 Costochondritis, Chest wall syndrome, Musculoskeletal pain





Disposition: HOME SELF-CARE


Condition: Good


Instructions (If sedation given, give patient instructions):  Costochondritis 

(ED), Musculoskeletal Pain (ED)


Additional Instructions: 


Over-the-counter Advil or Motrin.  Follow-up with her doctor I do recommend an 

outpatient stress test


Is patient prescribed a controlled substance at d/c from ED?: No


Referrals: 


Joss Hollins DO [Primary Care Provider] - 1-2 days


Decision Time: 15:30

## 2022-04-18 ENCOUNTER — HOSPITAL ENCOUNTER (OUTPATIENT)
Dept: HOSPITAL 47 - RADXRMAIN | Age: 32
Discharge: HOME | End: 2022-04-18
Attending: FAMILY MEDICINE
Payer: COMMERCIAL

## 2022-04-18 DIAGNOSIS — K57.93: Primary | ICD-10-CM

## 2022-04-18 PROCEDURE — 74019 RADEX ABDOMEN 2 VIEWS: CPT

## 2022-04-18 NOTE — XR
2 view abdomen

 

HISTORY: Diverticulitis

 

2 views the abdomen are submitted on 3 images

 

Correlation to CT scan 2/12/2019

 

Lung bases are clear. No evident bowel obstruction or pneumoperitoneum. Suspect there is retained fec
al debris throughout the distribution of the colon. No pathologic calcification is seen. There is a s
light spinal curvature. Bone mineralization is normal.

 

IMPRESSION: Correlate for fecal stasis.